# Patient Record
Sex: FEMALE | Race: WHITE | NOT HISPANIC OR LATINO | Employment: FULL TIME | URBAN - NONMETROPOLITAN AREA
[De-identification: names, ages, dates, MRNs, and addresses within clinical notes are randomized per-mention and may not be internally consistent; named-entity substitution may affect disease eponyms.]

---

## 2017-01-09 ENCOUNTER — LAB (OUTPATIENT)
Dept: FAMILY MEDICINE CLINIC | Facility: CLINIC | Age: 57
End: 2017-01-09

## 2017-01-09 DIAGNOSIS — I10 BENIGN HYPERTENSION: ICD-10-CM

## 2017-01-09 DIAGNOSIS — Z00.00 ROUTINE MEDICAL EXAM: Primary | ICD-10-CM

## 2017-01-09 PROCEDURE — 36415 COLL VENOUS BLD VENIPUNCTURE: CPT | Performed by: FAMILY MEDICINE

## 2017-01-10 LAB
ALBUMIN SERPL-MCNC: 4.5 G/DL (ref 3.5–5.5)
ALBUMIN/GLOB SERPL: 1.7 {RATIO} (ref 1.1–2.5)
ALP SERPL-CCNC: 84 IU/L (ref 39–117)
ALT SERPL-CCNC: 45 IU/L (ref 0–32)
AST SERPL-CCNC: 25 IU/L (ref 0–40)
BASOPHILS # BLD AUTO: 0 X10E3/UL (ref 0–0.2)
BASOPHILS NFR BLD AUTO: 0 %
BILIRUB SERPL-MCNC: 0.4 MG/DL (ref 0–1.2)
BUN SERPL-MCNC: 13 MG/DL (ref 6–24)
BUN/CREAT SERPL: 18 (ref 9–23)
CALCIUM SERPL-MCNC: 9.4 MG/DL (ref 8.7–10.2)
CHLORIDE SERPL-SCNC: 101 MMOL/L (ref 96–106)
CHOLEST SERPL-MCNC: 285 MG/DL (ref 100–199)
CO2 SERPL-SCNC: 24 MMOL/L (ref 18–29)
CREAT SERPL-MCNC: 0.71 MG/DL (ref 0.57–1)
EOSINOPHIL # BLD AUTO: 0.1 X10E3/UL (ref 0–0.4)
EOSINOPHIL NFR BLD AUTO: 1 %
ERYTHROCYTE [DISTWIDTH] IN BLOOD BY AUTOMATED COUNT: 14.7 % (ref 12.3–15.4)
GLOBULIN SER CALC-MCNC: 2.7 G/DL (ref 1.5–4.5)
GLUCOSE SERPL-MCNC: 118 MG/DL (ref 65–99)
HCT VFR BLD AUTO: 39.7 % (ref 34–46.6)
HCV AB S/CO SERPL IA: <0.1 S/CO RATIO (ref 0–0.9)
HDLC SERPL-MCNC: 45 MG/DL
HGB BLD-MCNC: 13.8 G/DL (ref 11.1–15.9)
LDLC SERPL CALC-MCNC: 169 MG/DL (ref 0–99)
LYMPHOCYTES # BLD AUTO: 2.1 X10E3/UL (ref 0.7–3.1)
LYMPHOCYTES NFR BLD AUTO: 29 %
MCH RBC QN AUTO: 28.8 PG (ref 26.6–33)
MCHC RBC AUTO-ENTMCNC: 34.8 G/DL (ref 31.5–35.7)
MCV RBC AUTO: 83 FL (ref 79–97)
MONOCYTES # BLD AUTO: 0.4 X10E3/UL (ref 0.1–0.9)
MONOCYTES NFR BLD AUTO: 6 %
NEUTROPHILS # BLD AUTO: 4.6 X10E3/UL (ref 1.4–7)
NEUTROPHILS NFR BLD AUTO: 64 %
PLATELET # BLD AUTO: 263 X10E3/UL (ref 150–379)
POTASSIUM SERPL-SCNC: 4.2 MMOL/L (ref 3.5–5.2)
PROT SERPL-MCNC: 7.2 G/DL (ref 6–8.5)
RBC # BLD AUTO: 4.79 X10E6/UL (ref 3.77–5.28)
SODIUM SERPL-SCNC: 138 MMOL/L (ref 134–144)
TRIGL SERPL-MCNC: 355 MG/DL (ref 0–149)
TSH SERPL DL<=0.005 MIU/L-ACNC: 1.51 UIU/ML (ref 0.45–4.5)
VLDLC SERPL CALC-MCNC: 71 MG/DL (ref 5–40)
WBC # BLD AUTO: 7.2 X10E3/UL (ref 3.4–10.8)

## 2017-01-16 ENCOUNTER — OFFICE VISIT (OUTPATIENT)
Dept: FAMILY MEDICINE CLINIC | Facility: CLINIC | Age: 57
End: 2017-01-16

## 2017-01-16 VITALS
DIASTOLIC BLOOD PRESSURE: 78 MMHG | HEIGHT: 67 IN | WEIGHT: 206 LBS | OXYGEN SATURATION: 97 % | SYSTOLIC BLOOD PRESSURE: 110 MMHG | BODY MASS INDEX: 32.33 KG/M2 | HEART RATE: 86 BPM

## 2017-01-16 DIAGNOSIS — E66.09 OBESITY DUE TO EXCESS CALORIES, UNSPECIFIED OBESITY SEVERITY: ICD-10-CM

## 2017-01-16 DIAGNOSIS — M19.041 PRIMARY OSTEOARTHRITIS OF BOTH HANDS: ICD-10-CM

## 2017-01-16 DIAGNOSIS — E78.2 MIXED HYPERLIPIDEMIA: ICD-10-CM

## 2017-01-16 DIAGNOSIS — Z82.5 FAMILY HISTORY OF ASTHMA: ICD-10-CM

## 2017-01-16 DIAGNOSIS — I10 BENIGN HYPERTENSION: Primary | ICD-10-CM

## 2017-01-16 DIAGNOSIS — R73.03 PREDIABETES: ICD-10-CM

## 2017-01-16 DIAGNOSIS — M19.042 PRIMARY OSTEOARTHRITIS OF BOTH HANDS: ICD-10-CM

## 2017-01-16 DIAGNOSIS — R73.9 HYPERGLYCEMIA: ICD-10-CM

## 2017-01-16 DIAGNOSIS — Z23 NEED FOR VACCINATION: ICD-10-CM

## 2017-01-16 DIAGNOSIS — R74.8 ABNORMAL LIVER ENZYMES: ICD-10-CM

## 2017-01-16 PROBLEM — K21.9 GASTROESOPHAGEAL REFLUX DISEASE WITHOUT ESOPHAGITIS: Status: ACTIVE | Noted: 2017-01-16

## 2017-01-16 PROBLEM — E78.5 HYPERLIPEMIA: Status: ACTIVE | Noted: 2017-01-16

## 2017-01-16 PROBLEM — G43.109 MIGRAINE WITH AURA AND WITHOUT STATUS MIGRAINOSUS, NOT INTRACTABLE: Status: ACTIVE | Noted: 2017-01-16

## 2017-01-16 PROBLEM — M19.049 PRIMARY OSTEOARTHRITIS OF HAND: Status: ACTIVE | Noted: 2017-01-16

## 2017-01-16 LAB — HBA1C MFR BLD: 6.2 %

## 2017-01-16 PROCEDURE — 83036 HEMOGLOBIN GLYCOSYLATED A1C: CPT | Performed by: FAMILY MEDICINE

## 2017-01-16 PROCEDURE — 99213 OFFICE O/P EST LOW 20 MIN: CPT | Performed by: FAMILY MEDICINE

## 2017-01-16 PROCEDURE — 90471 IMMUNIZATION ADMIN: CPT | Performed by: FAMILY MEDICINE

## 2017-01-16 PROCEDURE — 99396 PREV VISIT EST AGE 40-64: CPT | Performed by: FAMILY MEDICINE

## 2017-01-16 PROCEDURE — 90715 TDAP VACCINE 7 YRS/> IM: CPT | Performed by: FAMILY MEDICINE

## 2017-01-16 NOTE — MR AVS SNAPSHOT
Clarice Dwyer   1/16/2017 10:15 AM   Office Visit    Dept Phone:  [phone]   Encounter #:  [id number]    Provider:  Vikas Fields MD   Department:  [facility]                Your Full Care Plan              Your Updated Medication List          This list is accurate as of: 1/16/17 11:17 AM.  Always use your most recent med list.                fish oil 1000 MG capsule capsule       losartan-hydrochlorothiazide 50-12.5 MG per tablet   Commonly known as:  HYZAAR   Take 1 tablet by mouth Daily.       MOVE FREE JOINT HEALTH ADVANCE PO       vitamin D3 5000 UNITS capsule capsule               We Performed the Following     POC Glycosylated Hemoglobin (Hb A1C)     Tdap Vaccine Greater Than or Equal To 8yo IM       You Were Diagnosed With        Codes Comments    Benign hypertension    -  Primary ICD-10-CM: I10  ICD-9-CM: 401.1     Primary osteoarthritis of both hands     ICD-10-CM: M19.041, M19.042  ICD-9-CM: 715.14     Hyperglycemia     ICD-10-CM: R73.9  ICD-9-CM: 790.29     Family history of asthma     ICD-10-CM: Z82.5  ICD-9-CM: V17.5     Mixed hyperlipidemia     ICD-10-CM: E78.2  ICD-9-CM: 272.2     Need for vaccination     ICD-10-CM: Z23  ICD-9-CM: V05.9     Obesity due to excess calories, unspecified obesity severity     ICD-10-CM: E66.09  ICD-9-CM: 278.00     Prediabetes     ICD-10-CM: R73.03  ICD-9-CM: 790.29     Abnormal liver enzymes     ICD-10-CM: R74.8  ICD-9-CM: 790.5       Instructions     None    Patient Instructions History      Upcoming Appointments     Visit Type Date Time Department    FOLLOW UP 1/16/2017 10:15 AM [facility]      [facility] Signup     Our records indicate that you have declined [employer] signup. If you would like to sign up for [facility] [employer], please email [email or web address] or call [phone] to obtain an activation code.             Other Info from Your Visit           Allergies     No  "Known Allergies      Reason for Visit     Hypertension           Vital Signs     Blood Pressure Pulse Height Weight Oxygen Saturation Body Mass Index    110/78 (BP Location: Left arm, Patient Position: Sitting) 86 67\" (170.2 cm) 206 lb (93.4 kg) 97% 32.26 kg/m2    Smoking Status                   Never Smoker           Problems and Diagnoses Noted     High blood pressure    Family history of asthma    Acid reflux disease    High cholesterol or triglycerides    Migraines    Obesity due to excess calories    Degenerative arthritis of hand    Hyperglycemia        Need for vaccination        Prediabetes        Abnormal liver enzymes            "

## 2017-01-16 NOTE — PROGRESS NOTES
Subjective   Clarice Dwyer is a 56 y.o. female.     History of Present Illness   56-year-old female who is seen at last visit due to hypertension.  Was started on losartan HCT.  General labs were done and she was to be seen today for a more general exam including health maintenance issues.    Cough.  Feels that it's better on losartin than on a previous Ace.      The following portions of the patient's history were reviewed and updated as appropriate: allergies, current medications, past family history, past medical history, past social history, past surgical history and problem list.    Review of Systems   Constitutional: Negative for appetite change, fatigue, fever and unexpected weight change.   HENT: Negative for congestion, dental problem, ear discharge, ear pain, hearing loss, postnasal drip, rhinorrhea, sneezing, sore throat, trouble swallowing and voice change.    Eyes: Positive for visual disturbance (contacts). Negative for discharge, redness and itching.   Respiratory: Positive for cough (persistant, mild). Negative for apnea, shortness of breath and wheezing.    Cardiovascular: Negative for chest pain, palpitations and leg swelling.   Gastrointestinal: Negative for abdominal distention, abdominal pain, blood in stool, constipation, diarrhea, nausea and vomiting.   Endocrine: Negative for cold intolerance, heat intolerance, polydipsia, polyphagia and polyuria.   Genitourinary: Positive for menstrual problem. Negative for dysuria, frequency, genital sores, hematuria and urgency.   Musculoskeletal: Positive for arthralgias (mostly hands). Negative for back pain, gait problem, joint swelling, myalgias and neck pain.   Skin: Negative for rash.   Allergic/Immunologic: Negative for environmental allergies and food allergies.   Neurological: Positive for headaches (lot of theses). Negative for dizziness, syncope, weakness and light-headedness.   Hematological: Negative for adenopathy. Does not  bruise/bleed easily.   Psychiatric/Behavioral: Negative for decreased concentration, dysphoric mood and sleep disturbance. The patient is not nervous/anxious.        Objective   Physical Exam   Constitutional: She is oriented to person, place, and time. She appears well-developed and well-nourished.   HENT:   Right Ear: External ear normal.   Left Ear: External ear normal.   Nose: Nose normal.   Mouth/Throat: Oropharynx is clear and moist.   Eyes: Conjunctivae and EOM are normal.   Neck: Normal range of motion. Neck supple. No thyromegaly present.   Cardiovascular: Normal rate, regular rhythm, normal heart sounds and intact distal pulses.    No murmur heard.  Pulmonary/Chest: Effort normal and breath sounds normal.   Abdominal: Soft. Bowel sounds are normal. She exhibits no mass. There is no tenderness.   Musculoskeletal: Normal range of motion. She exhibits no edema.   Lymphadenopathy:     She has no cervical adenopathy.     She has no axillary adenopathy.        Right: No inguinal and no supraclavicular adenopathy present.        Left: No inguinal and no supraclavicular adenopathy present.   Neurological: She is alert and oriented to person, place, and time. Coordination normal.   Skin: Skin is warm and dry. No rash noted. No pallor.   Psychiatric: She has a normal mood and affect. Her behavior is normal. Thought content normal.   Vitals reviewed.      Assessment/Plan   Clarice was seen today for hypertension.    Diagnoses and all orders for this visit:    Benign hypertension    Primary osteoarthritis of both hands    Hyperglycemia  -     POC Glycosylated Hemoglobin (Hb A1C)    Family history of asthma    Mixed hyperlipidemia    Need for vaccination  -     Tdap Vaccine Greater Than or Equal To 6yo IM    Obesity due to excess calories, unspecified obesity severity    Prediabetes    Abnormal liver enzymes      Hypertension.  General blood work looks okay.  Sameera seems to agree with her and will  continue.    Osteoarthritis.  Definite diagnosis.  Significant Heberden's nodes in both hands.    Family history of asthma.  Sister.  I mention this because of her cough.  I don't think there is any question that Clarice may also have mild intermittent asthma.    Hyperlipidemia.  A statin has been recommended in the past but she states that her father  from complications from a statin and she's hesitant to take.  I calculated her 10 year cardiovascular risk at 3.2%.    Health maintenance.  Last mammogram was 1-1/2 years ago.  She declines a breast exam today but I told her I would order a mammogram if she will wants one.  I do not think that she needs Pap smears since she had a hysterectomy for noncancer problem.  She has agreed to get a tetanus.  I discussed encouraged her to get a colonoscopy.    Hyperglycemia.  Fasting blood sugar was 118.  Today in the office I did A1c which was 6.2 which makes her diagnosis pre-diabetes.  I am not going to change her 10 year risk at this point because of it.    Addendum.  She declines a colonoscopy at this time.  I have given her an order for screening mammogram but she is not planning to get one until the summer.    Prediabetes.  Repeat A1c in 6 months    Abnormal liver function.  This is only up slightly.  It may be from Tylenol which she takes for frequent stress headaches.  I mention it though because of her weight and her lipids.  Offered to send her to the nutritionist but she declines.  She is going to try to get some weight off.

## 2017-02-28 DIAGNOSIS — I10 BENIGN HYPERTENSION: ICD-10-CM

## 2017-02-28 RX ORDER — LOSARTAN POTASSIUM AND HYDROCHLOROTHIAZIDE 12.5; 5 MG/1; MG/1
TABLET ORAL
Qty: 30 TABLET | Refills: 1 | Status: SHIPPED | OUTPATIENT
Start: 2017-02-28 | End: 2017-05-04 | Stop reason: SDUPTHER

## 2017-05-04 DIAGNOSIS — I10 BENIGN HYPERTENSION: ICD-10-CM

## 2017-05-04 RX ORDER — LOSARTAN POTASSIUM AND HYDROCHLOROTHIAZIDE 12.5; 5 MG/1; MG/1
TABLET ORAL
Qty: 30 TABLET | Refills: 0 | Status: SHIPPED | OUTPATIENT
Start: 2017-05-04 | End: 2017-06-03 | Stop reason: SDUPTHER

## 2017-06-03 DIAGNOSIS — I10 BENIGN HYPERTENSION: ICD-10-CM

## 2017-06-05 RX ORDER — LOSARTAN POTASSIUM AND HYDROCHLOROTHIAZIDE 12.5; 5 MG/1; MG/1
TABLET ORAL
Qty: 30 TABLET | Refills: 5 | Status: SHIPPED | OUTPATIENT
Start: 2017-06-05

## 2017-07-24 ENCOUNTER — OFFICE VISIT (OUTPATIENT)
Dept: FAMILY MEDICINE CLINIC | Facility: CLINIC | Age: 57
End: 2017-07-24

## 2017-07-24 VITALS
WEIGHT: 177 LBS | OXYGEN SATURATION: 96 % | BODY MASS INDEX: 27.78 KG/M2 | DIASTOLIC BLOOD PRESSURE: 70 MMHG | HEART RATE: 79 BPM | SYSTOLIC BLOOD PRESSURE: 110 MMHG | HEIGHT: 67 IN

## 2017-07-24 DIAGNOSIS — E78.2 MIXED HYPERLIPIDEMIA: ICD-10-CM

## 2017-07-24 DIAGNOSIS — E66.09 OBESITY DUE TO EXCESS CALORIES, UNSPECIFIED OBESITY SEVERITY: ICD-10-CM

## 2017-07-24 DIAGNOSIS — Z12.11 SCREEN FOR COLON CANCER: ICD-10-CM

## 2017-07-24 DIAGNOSIS — I10 BENIGN HYPERTENSION: Primary | ICD-10-CM

## 2017-07-24 DIAGNOSIS — Z12.39 SCREENING FOR BREAST CANCER: ICD-10-CM

## 2017-07-24 DIAGNOSIS — R73.9 HYPERGLYCEMIA: ICD-10-CM

## 2017-07-24 PROBLEM — I87.2 VENOUS INSUFFICIENCY OF RIGHT LEG: Status: ACTIVE | Noted: 2017-07-24

## 2017-07-24 PROCEDURE — 99214 OFFICE O/P EST MOD 30 MIN: CPT | Performed by: FAMILY MEDICINE

## 2017-07-24 PROCEDURE — 36415 COLL VENOUS BLD VENIPUNCTURE: CPT | Performed by: FAMILY MEDICINE

## 2017-07-25 LAB
ALBUMIN SERPL-MCNC: 4.6 G/DL (ref 3.5–5.5)
ALBUMIN/GLOB SERPL: 1.8 {RATIO} (ref 1.2–2.2)
ALP SERPL-CCNC: 105 IU/L (ref 39–117)
ALT SERPL-CCNC: 42 IU/L (ref 0–32)
AST SERPL-CCNC: 23 IU/L (ref 0–40)
BASOPHILS # BLD AUTO: 0 X10E3/UL (ref 0–0.2)
BASOPHILS NFR BLD AUTO: 0 %
BILIRUB SERPL-MCNC: 0.4 MG/DL (ref 0–1.2)
BUN SERPL-MCNC: 12 MG/DL (ref 6–24)
BUN/CREAT SERPL: 17 (ref 9–23)
CALCIUM SERPL-MCNC: 9.6 MG/DL (ref 8.7–10.2)
CHLORIDE SERPL-SCNC: 100 MMOL/L (ref 96–106)
CHOLEST SERPL-MCNC: 269 MG/DL (ref 100–199)
CO2 SERPL-SCNC: 29 MMOL/L (ref 18–29)
CREAT SERPL-MCNC: 0.7 MG/DL (ref 0.57–1)
EOSINOPHIL # BLD AUTO: 0.1 X10E3/UL (ref 0–0.4)
EOSINOPHIL NFR BLD AUTO: 1 %
ERYTHROCYTE [DISTWIDTH] IN BLOOD BY AUTOMATED COUNT: 14.2 % (ref 12.3–15.4)
GLOBULIN SER CALC-MCNC: 2.6 G/DL (ref 1.5–4.5)
GLUCOSE SERPL-MCNC: 102 MG/DL (ref 65–99)
HBA1C MFR BLD: 5.8 % (ref 4.8–5.6)
HCT VFR BLD AUTO: 38.7 % (ref 34–46.6)
HDLC SERPL-MCNC: 44 MG/DL
HGB BLD-MCNC: 13.5 G/DL (ref 11.1–15.9)
LDLC SERPL CALC-MCNC: ABNORMAL MG/DL (ref 0–99)
LYMPHOCYTES # BLD AUTO: 2.1 X10E3/UL (ref 0.7–3.1)
LYMPHOCYTES NFR BLD AUTO: 29 %
MCH RBC QN AUTO: 29.6 PG (ref 26.6–33)
MCHC RBC AUTO-ENTMCNC: 34.9 G/DL (ref 31.5–35.7)
MCV RBC AUTO: 85 FL (ref 79–97)
MONOCYTES # BLD AUTO: 0.4 X10E3/UL (ref 0.1–0.9)
MONOCYTES NFR BLD AUTO: 6 %
NEUTROPHILS # BLD AUTO: 4.6 X10E3/UL (ref 1.4–7)
NEUTROPHILS NFR BLD AUTO: 64 %
PLATELET # BLD AUTO: 233 X10E3/UL (ref 150–379)
POTASSIUM SERPL-SCNC: 4 MMOL/L (ref 3.5–5.2)
PROT SERPL-MCNC: 7.2 G/DL (ref 6–8.5)
RBC # BLD AUTO: 4.56 X10E6/UL (ref 3.77–5.28)
SODIUM SERPL-SCNC: 138 MMOL/L (ref 134–144)
TRIGL SERPL-MCNC: 406 MG/DL (ref 0–149)
TSH SERPL DL<=0.005 MIU/L-ACNC: 1.4 UIU/ML (ref 0.45–4.5)
VLDLC SERPL CALC-MCNC: ABNORMAL MG/DL (ref 5–40)
WBC # BLD AUTO: 7.2 X10E3/UL (ref 3.4–10.8)

## 2017-07-25 NOTE — PROGRESS NOTES
Call.  No anemia.  Renal and liver test are basically normal.  Thyroid normal.  Cholesterol is still somewhat elevated 269 whereas 6 months ago was 285.  Triglycerides are still elevated.  This means that the elevated triglycerides represent an inherited type of elevated cholesterol and triglycerides.  Taking the omega-3 Fish oil I think is still a good idea.  Taking a statin to lower total cholesterol and perhaps triglycerides may also be a good idea.  Continue with a healthy changes that you have made.  We may just repeat these in 6 months and have this discussion again.  Regarding elevated blood glucose..  3 month blood sugar test, the A1c, is now 5.8 which is essentially normal.  This is a great accomplishment with your weight loss.  Keep up the good work.

## 2017-07-26 ENCOUNTER — TELEPHONE (OUTPATIENT)
Dept: FAMILY MEDICINE CLINIC | Facility: CLINIC | Age: 57
End: 2017-07-26

## 2017-07-27 ENCOUNTER — TELEPHONE (OUTPATIENT)
Dept: FAMILY MEDICINE CLINIC | Facility: CLINIC | Age: 57
End: 2017-07-27

## 2017-07-27 NOTE — TELEPHONE ENCOUNTER
----- Message from Vikas Fields MD sent at 7/25/2017  7:38 AM EDT -----  Call.  No anemia.  Renal and liver test are basically normal.  Thyroid normal.  Cholesterol is still somewhat elevated 269 whereas 6 months ago was 285.  Triglycerides are still elevated.  This means that the elevated triglycerides represent an inherited type of elevated cholesterol and triglycerides.  Taking the omega-3 Fish oil I think is still a good idea.  Taking a statin to lower total cholesterol and perhaps triglycerides may also be a good idea.  Continue with a healthy changes that you have made.  We may just repeat these in 6 months and have this discussion again.  Regarding elevated blood glucose..  3 month blood sugar test, the A1c, is now 5.8 which is essentially normal.  This is a great accomplishment with your weight loss.  Keep up the good work.

## 2019-03-08 NOTE — TELEPHONE ENCOUNTER
Clarice notified and does not want to take a statin but will take the Fish oil and return in 6 months to follow up on her cholesterol.   no

## 2023-09-25 NOTE — PROGRESS NOTES
Subjective   Clarice Dwyer is a 56 y.o. female.     History of Present Illness   56-year-old female with a history of hypertension, hyperlipidemia, hyperglycemia with an A1c of 6.2, obesity.  General physical exam was done in January and was also recommended that she have a breast mammogram and a colonoscopy.  These have not been done.    No, she is lost 30 pounds.  She also has chronic GERD which is now essentially resolved.  Our plan today was to repeat an A1c as well as look at hypertension.  The following portions of the patient's history were reviewed and updated as appropriate: allergies, current medications, past family history, past medical history, past social history, past surgical history and problem list.    Review of Systems   Constitutional: Negative for fatigue and unexpected weight change.   Respiratory: Negative for cough and shortness of breath.    Cardiovascular: Negative for chest pain and leg swelling.   Gastrointestinal: Negative for abdominal distention, abdominal pain and blood in stool.   Endocrine: Negative for polydipsia and polyuria.   Neurological: Positive for dizziness (rare).       Objective   Physical Exam   Constitutional: She is oriented to person, place, and time. She appears well-developed and well-nourished.   HENT:   Right Ear: External ear normal.   Left Ear: External ear normal.   Nose: Nose normal.   Mouth/Throat: Oropharynx is clear and moist.   Eyes: Conjunctivae and EOM are normal.   Neck: Normal range of motion. Neck supple. No thyromegaly present.   Cardiovascular: Normal rate, regular rhythm, normal heart sounds and intact distal pulses.    No murmur heard.  Significant varicosities in the right lower extremity   Pulmonary/Chest: Effort normal and breath sounds normal.   Abdominal: Soft. Bowel sounds are normal. She exhibits no mass. There is no tenderness.   Musculoskeletal: Normal range of motion. She exhibits no edema.   Lymphadenopathy:     She has no  cervical adenopathy.     She has no axillary adenopathy.        Right: No inguinal and no supraclavicular adenopathy present.        Left: No inguinal and no supraclavicular adenopathy present.   Neurological: She is alert and oriented to person, place, and time. Coordination normal.   Skin: Skin is warm and dry. No rash noted. No pallor.   Psychiatric: She has a normal mood and affect. Her behavior is normal. Thought content normal.   Vitals reviewed.      Assessment/Plan   Diagnoses and all orders for this visit:    Benign hypertension  -     CBC & Differential  -     Comprehensive Metabolic Panel  -     Lipid Panel  -     TSH    Mixed hyperlipidemia    Obesity due to excess calories, unspecified obesity severity    Hyperglycemia  -     Hemoglobin A1c    Screening for breast cancer  -     Mammo Screening Bilateral With CAD    Screen for colon cancer  -     Ambulatory Referral to Gastroenterology    Hypertension.  She's been having a little dizziness and I think it's because she's lost weight and her pressure is very normal.  I want her to monitor her blood pressure and start reducing her blood pressure medicine to Monday Wednesday Friday.  If pressure stays below 130/80, then she can reduce it further and eventually wean it.    Hyperlipidemia.  I want to look at lipids again and see where we stand.    Obesity.  Has lost 30 pounds and looks great.    Hyperglycemia.  Look at A1c.  Suspect it'll be considerably lower.    Significant varicosities.  I have given her the name of Dr. Vikas Chambers.    Addendum.  She has agreed to a referral for colonoscopy and a screening mammogram.    Call for results.          Detail Level: Detailed Depth Of Biopsy: dermis Was A Bandage Applied: Yes Size Of Lesion In Cm: 0.5 X Size Of Lesion In Cm: 0 Biopsy Type: H and E Biopsy Method: 15 blade Anesthesia Type: 1% lidocaine with epinephrine Anesthesia Volume In Cc: 1.5 Hemostasis: Electrocautery Wound Care: Petrolatum Dressing: bandage Destruction After The Procedure: No Type Of Destruction Used: Curettage Curettage Text: The wound bed was treated with curettage after the biopsy was performed. Cryotherapy Text: The wound bed was treated with cryotherapy after the biopsy was performed. Electrodesiccation Text: The wound bed was treated with electrodesiccation after the biopsy was performed. Electrodesiccation And Curettage Text: The wound bed was treated with electrodesiccation and curettage after the biopsy was performed. Silver Nitrate Text: The wound bed was treated with silver nitrate after the biopsy was performed. Lab: [id number] Lab Facility: [id number] Path Notes Override (Will Replace All Of The Above Text): Atypical appearing pigmented neoplasm Consent: Written consent was obtained and risks were reviewed including but not limited to scarring, infection, bleeding, scabbing, incomplete removal, nerve damage and allergy to anesthesia. Post-Care Instructions: I reviewed with the patient in detail post-care instructions. Patient is to keep the biopsy site dry overnight, and then apply bacitracin twice daily until healed. Patient may apply hydrogen peroxide soaks to remove any crusting. Notification Instructions: Patient will be notified of biopsy results. However, patient instructed to call the office if not contacted within 2 weeks. Billing Type: Third-Party Bill Information: Selecting Yes will display possible errors in your note based on the variables you have selected. This validation is only offered as a suggestion for you. PLEASE NOTE THAT THE VALIDATION TEXT WILL BE REMOVED WHEN YOU FINALIZE YOUR NOTE. IF YOU WANT TO FAX A PRELIMINARY NOTE YOU WILL NEED TO TOGGLE THIS TO 'NO' IF YOU DO NOT WANT IT IN YOUR FAXED NOTE.